# Patient Record
Sex: MALE | Race: OTHER | Employment: OTHER | URBAN - METROPOLITAN AREA
[De-identification: names, ages, dates, MRNs, and addresses within clinical notes are randomized per-mention and may not be internally consistent; named-entity substitution may affect disease eponyms.]

---

## 2022-01-14 ENCOUNTER — EMERGENCY VISIT (OUTPATIENT)
Dept: URBAN - METROPOLITAN AREA CLINIC 47 | Facility: CLINIC | Age: 22
End: 2022-01-14

## 2022-01-14 DIAGNOSIS — T15.10XA: ICD-10-CM

## 2022-01-14 PROCEDURE — 92012 INTRM OPH EXAM EST PATIENT: CPT

## 2022-01-14 ASSESSMENT — VISUAL ACUITY
OS_SC: 20/20
OU_SC: 20/20
OD_SC: 20/20

## 2022-01-14 NOTE — PATIENT DISCUSSION
No foreign material/ fiberglass seen but area of FB tracking in cornea. Eye was lavaged and upper eyelid was everted and swabbed with cotton tipped applicator and proparacaines. Gel tears as treatment. No sign of infection.